# Patient Record
Sex: FEMALE | URBAN - METROPOLITAN AREA
[De-identification: names, ages, dates, MRNs, and addresses within clinical notes are randomized per-mention and may not be internally consistent; named-entity substitution may affect disease eponyms.]

---

## 2023-01-24 ENCOUNTER — OFFICE VISIT (OUTPATIENT)
Dept: URBAN - METROPOLITAN AREA CLINIC 27 | Facility: CLINIC | Age: 38
End: 2023-01-24

## 2023-01-24 NOTE — HPI-TODAY'S VISIT:
Ms. Darling is a 37 YOF new patient presenting for evaluation of possible gastritis with ?Dr. Owens. She is self pay.